# Patient Record
Sex: MALE | Race: WHITE | NOT HISPANIC OR LATINO
[De-identification: names, ages, dates, MRNs, and addresses within clinical notes are randomized per-mention and may not be internally consistent; named-entity substitution may affect disease eponyms.]

---

## 2019-02-25 ENCOUNTER — RECORD ABSTRACTING (OUTPATIENT)
Age: 44
End: 2019-02-25

## 2019-02-25 DIAGNOSIS — J34.89 OTHER SPECIFIED DISORDERS OF NOSE AND NASAL SINUSES: ICD-10-CM

## 2019-02-25 DIAGNOSIS — S09.92XA UNSPECIFIED INJURY OF NOSE, INITIAL ENCOUNTER: ICD-10-CM

## 2019-02-25 DIAGNOSIS — Z91.09 OTHER ALLERGY STATUS, OTHER THAN TO DRUGS AND BIOLOGICAL SUBSTANCES: ICD-10-CM

## 2019-02-25 DIAGNOSIS — Z83.3 FAMILY HISTORY OF DIABETES MELLITUS: ICD-10-CM

## 2019-02-25 DIAGNOSIS — Z87.09 PERSONAL HISTORY OF OTHER DISEASES OF THE RESPIRATORY SYSTEM: ICD-10-CM

## 2019-02-25 DIAGNOSIS — M95.0 ACQUIRED DEFORMITY OF NOSE: ICD-10-CM

## 2019-02-25 DIAGNOSIS — J34.2 DEVIATED NASAL SEPTUM: ICD-10-CM

## 2019-02-25 DIAGNOSIS — J30.9 ALLERGIC RHINITIS, UNSPECIFIED: ICD-10-CM

## 2019-02-25 RX ORDER — FEXOFENADINE HYDROCHLORIDE 180 MG/1
TABLET ORAL DAILY
Refills: 0 | Status: ACTIVE | COMMUNITY

## 2019-03-04 ENCOUNTER — APPOINTMENT (OUTPATIENT)
Dept: OTOLARYNGOLOGY | Facility: CLINIC | Age: 44
End: 2019-03-04

## 2020-03-10 ENCOUNTER — NON-APPOINTMENT (OUTPATIENT)
Age: 45
End: 2020-03-10

## 2020-03-10 ENCOUNTER — APPOINTMENT (OUTPATIENT)
Dept: CARDIOLOGY | Facility: CLINIC | Age: 45
End: 2020-03-10
Payer: COMMERCIAL

## 2020-03-10 VITALS
HEART RATE: 62 BPM | SYSTOLIC BLOOD PRESSURE: 114 MMHG | DIASTOLIC BLOOD PRESSURE: 72 MMHG | BODY MASS INDEX: 29.3 KG/M2 | OXYGEN SATURATION: 95 % | WEIGHT: 216 LBS

## 2020-03-10 DIAGNOSIS — R07.89 OTHER CHEST PAIN: ICD-10-CM

## 2020-03-10 DIAGNOSIS — R00.2 PALPITATIONS: ICD-10-CM

## 2020-03-10 DIAGNOSIS — E78.1 PURE HYPERGLYCERIDEMIA: ICD-10-CM

## 2020-03-10 PROCEDURE — 93000 ELECTROCARDIOGRAM COMPLETE: CPT

## 2020-03-10 PROCEDURE — 99205 OFFICE O/P NEW HI 60 MIN: CPT

## 2020-03-10 PROCEDURE — 36415 COLL VENOUS BLD VENIPUNCTURE: CPT

## 2020-03-10 NOTE — REASON FOR VISIT
[Chest Pain] : chest pain [FreeTextEntry1] : Family history of coronary heart disease (not premature)

## 2020-03-10 NOTE — HISTORY OF PRESENT ILLNESS
[FreeTextEntry1] : 45-year-old gentleman who returns after a 4-year hiatus parents related to concerns about risk factors for coronary heart disease.  In the interval since I saw him, his father suffered 1 MI at age 70 and a second at age 74. \par \par He has been feeling generally well.  No recurrence of syncope.  He suffers with anxiety.  Does not exercise but is moderately active walking his daughter to her  (1/2 mile round trip) no effort provoke symptoms.  He, especially in recent months experiences random focal left inframammary pains which are fleeting.  In addition there is a sense of chest heaviness associated with a sense of "dread" also occurring randomly lasting perhaps a few minutes.  These episodes never occur with physical exertion.  He reports occasional palpitations at which time his pulse is in the 90s strong and regular.  Notes occasional skipped beats as well.\par \par He has been much more compliant about his diet.  Lunch is typically egg whites and oatmeal.  Lunch is a salad with grilled chicken dinner is generally lean protein with a vegetable.  Some snacks.  Not smoking.  Rare alcohol.  3 cups of coffee daily\par \par No c/o throat,jaw, arm or upper back discomfort.  No dyspnea, orthopnea or PND.  No dizziness or syncope.  No edema or claudication.  Varicose veins.\par \par Last laboratory work was approximately 2 years ago.  Triglycerides were somewhat elevated.

## 2020-03-11 LAB
ALBUMIN SERPL ELPH-MCNC: 4.9 G/DL
ALP BLD-CCNC: 69 U/L
ALT SERPL-CCNC: 41 U/L
ANION GAP SERPL CALC-SCNC: 15 MMOL/L
AST SERPL-CCNC: 23 U/L
BASOPHILS # BLD AUTO: 0.05 K/UL
BASOPHILS NFR BLD AUTO: 0.6 %
BILIRUB SERPL-MCNC: 0.3 MG/DL
BUN SERPL-MCNC: 17 MG/DL
CALCIUM SERPL-MCNC: 9.7 MG/DL
CHLORIDE SERPL-SCNC: 105 MMOL/L
CHOLEST SERPL-MCNC: 154 MG/DL
CHOLEST/HDLC SERPL: 4.5 RATIO
CO2 SERPL-SCNC: 22 MMOL/L
CREAT SERPL-MCNC: 0.7 MG/DL
CRP SERPL HS-MCNC: 1.76 MG/L
EOSINOPHIL # BLD AUTO: 0.2 K/UL
EOSINOPHIL NFR BLD AUTO: 2.4 %
ESTIMATED AVERAGE GLUCOSE: 100 MG/DL
GLUCOSE SERPL-MCNC: 83 MG/DL
HBA1C MFR BLD HPLC: 5.1 %
HCT VFR BLD CALC: 44.9 %
HDLC SERPL-MCNC: 34 MG/DL
HGB BLD-MCNC: 14.4 G/DL
IMM GRANULOCYTES NFR BLD AUTO: 0.2 %
LDLC SERPL CALC-MCNC: 84 MG/DL
LDLC SERPL DIRECT ASSAY-MCNC: 92 MG/DL
LYMPHOCYTES # BLD AUTO: 2.71 K/UL
LYMPHOCYTES NFR BLD AUTO: 32 %
MAN DIFF?: NORMAL
MCHC RBC-ENTMCNC: 31.2 PG
MCHC RBC-ENTMCNC: 32.1 GM/DL
MCV RBC AUTO: 97.4 FL
MONOCYTES # BLD AUTO: 0.57 K/UL
MONOCYTES NFR BLD AUTO: 6.7 %
NEUTROPHILS # BLD AUTO: 4.92 K/UL
NEUTROPHILS NFR BLD AUTO: 58.1 %
PLATELET # BLD AUTO: 258 K/UL
POTASSIUM SERPL-SCNC: 4.2 MMOL/L
PROT SERPL-MCNC: 7.1 G/DL
RBC # BLD: 4.61 M/UL
RBC # FLD: 13.3 %
SODIUM SERPL-SCNC: 141 MMOL/L
TRIGL SERPL-MCNC: 183 MG/DL
TSH SERPL-ACNC: 1.4 UIU/ML
WBC # FLD AUTO: 8.47 K/UL

## 2021-03-09 ENCOUNTER — NON-APPOINTMENT (OUTPATIENT)
Age: 46
End: 2021-03-09

## 2025-02-23 ENCOUNTER — NON-APPOINTMENT (OUTPATIENT)
Age: 50
End: 2025-02-23